# Patient Record
Sex: MALE | Race: WHITE | NOT HISPANIC OR LATINO | Employment: OTHER | URBAN - METROPOLITAN AREA
[De-identification: names, ages, dates, MRNs, and addresses within clinical notes are randomized per-mention and may not be internally consistent; named-entity substitution may affect disease eponyms.]

---

## 2022-08-09 ENCOUNTER — OFFICE VISIT (OUTPATIENT)
Dept: NEUROLOGY | Facility: CLINIC | Age: 77
End: 2022-08-09

## 2022-08-09 VITALS
BODY MASS INDEX: 34.85 KG/M2 | HEART RATE: 59 BPM | SYSTOLIC BLOOD PRESSURE: 117 MMHG | DIASTOLIC BLOOD PRESSURE: 60 MMHG | HEIGHT: 73 IN | WEIGHT: 263 LBS

## 2022-08-09 DIAGNOSIS — F41.8 DEPRESSION WITH ANXIETY: ICD-10-CM

## 2022-08-09 DIAGNOSIS — F45.8 HYPERVENTILATION SYNDROME: Primary | ICD-10-CM

## 2022-08-09 PROCEDURE — 99205 OFFICE O/P NEW HI 60 MIN: CPT | Performed by: PSYCHIATRY & NEUROLOGY

## 2022-08-09 RX ORDER — TIOTROPIUM BROMIDE AND OLODATEROL 3.124; 2.736 UG/1; UG/1
1-2 SPRAY, METERED RESPIRATORY (INHALATION)
COMMUNITY

## 2022-08-09 RX ORDER — CETIRIZINE HYDROCHLORIDE 10 MG/1
10 TABLET ORAL DAILY
COMMUNITY

## 2022-08-09 RX ORDER — AMLODIPINE BESYLATE 2.5 MG/1
2.5 TABLET ORAL DAILY
COMMUNITY

## 2022-08-09 RX ORDER — FAMOTIDINE 20 MG/1
20 TABLET, FILM COATED ORAL 2 TIMES DAILY
COMMUNITY

## 2022-08-09 RX ORDER — FOSINOPRIL SODIUM 40 MG/1
40 TABLET ORAL DAILY
COMMUNITY

## 2022-08-09 RX ORDER — ATORVASTATIN CALCIUM 40 MG/1
40 TABLET, FILM COATED ORAL DAILY
COMMUNITY

## 2022-08-09 RX ORDER — SUCRALFATE 1 G/1
1 TABLET ORAL 4 TIMES DAILY
COMMUNITY

## 2022-08-09 RX ORDER — TERAZOSIN 5 MG/1
5 CAPSULE ORAL NIGHTLY
COMMUNITY

## 2022-08-09 RX ORDER — ASPIRIN 81 MG/1
81 TABLET, CHEWABLE ORAL DAILY
COMMUNITY

## 2022-08-09 RX ORDER — CELECOXIB 200 MG/1
200 CAPSULE ORAL DAILY
COMMUNITY

## 2022-08-09 RX ORDER — MEMANTINE HYDROCHLORIDE 5 MG/1
5 TABLET ORAL 2 TIMES DAILY
COMMUNITY

## 2022-08-09 RX ORDER — GABAPENTIN 100 MG/1
100 CAPSULE ORAL DAILY
COMMUNITY

## 2022-08-09 RX ORDER — DONEPEZIL HYDROCHLORIDE 10 MG/1
10 TABLET, FILM COATED ORAL NIGHTLY
COMMUNITY

## 2022-08-09 NOTE — ASSESSMENT & PLAN NOTE
I discussed with them that I would like for him to be seen by psychiatry to be treated for depression with anxiety.  I discussed with him that the spells that he is having is secondary to underlying psychiatric disorder and that needs to be treated for his spells to get better.  I will see him again in 2 months time for follow-up.    I do not think he has dementia.  Believe his memory problems secondary to depression.  On his  next clinic visit I will reevaluate him and do a mental status examination and take him off memantine Donepezil.

## 2022-08-09 NOTE — ASSESSMENT & PLAN NOTE
Discussed with him and his wife that he the spells that he is having is related to hyperventilation syndrome.  I discussed with them that the hyperventilation is related to underlying psychiatric disorder.  I believe the psychiatric disorder is longstanding depression with anxiety that has not been treated.  I counseled him and his wife regarding slowing down his breathing as well as calming himself down to prevent the spells from progressing.

## 2022-08-09 NOTE — PROGRESS NOTES
"Chief Complaint  Headache (Head CT @ [facility] )    Subjective          Matias Seth is a 76 y.o. male who presents to [facility]  History of Present Illness  76-year-old man evaluated for funny feeling in his head.  He states that this has been ongoing since 2002.  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  He has been working as a  for 30 years and he was in and out of the hospital for 2 to 3 years which she had blood red eyes, short-term memory loss, problems with breathing and he was undergoing Workmen's Compensation and he has not been working since that time and that type of job.  He states that stent of a headache.  Initially he was in here for headache.  He states that he gets this blurred vision, funny feeling in his head, dizziness, unsteadiness that comes in spells.  It can happen 2-3 times a day or once a day or may not happen in a day.  Is been ongoing on a daily basis for years.  If he gets up he is unsteady.  It can last anywhere from 30 seconds to a minute.  His wife is with him today.    She tells me that for the last 10 years he has had mood changes.  He gets upset easily.  He states that he has a lot of stressors in his head and has not seen his children in 17 years.  This is another marriage and they have been together for 16 years.  He has never been treated for depression.  He has been treated at this time for memory loss with memantine as well as Donepezil.    He states that he believes that the spells that he is having could be related to his breathing since if he overexerts himself he will bring on a spell.    Is reported to have unremarkable MRI and CT scan of the brain.    Objective   Vital Signs:   /60 (BP Location: Left arm, Patient Position: Sitting, Cuff Size: Adult)   Pulse 59   Ht 185.4 cm (73\")   Wt 119 kg (263 lb)   BMI 34.70 " kg/m²     Physical Exam   He is alert, fluent, phasic, follows commands well.  Optic disc are normal bilaterally, visual fields full confrontation, EMs full directions gaze, facial strength is full, soft palate elevation and tongue normal.  There is no weakness of the upper or lower extremities and with muscle testing.  Reflexes are decreased in the biceps, triceps, patellar's and ankles.  Cerebellar testing is intact.  Station gait is able to tiptoe, heel walk, brooke without difficulty.  Heart is regular and rhythm normal in rate.  Lungs are clear to auscultation.  I discussed with him regarding hyperventilation.  Hyperventilation for 30 seconds reproduce the same spells that she has been having for years.  His wife was there to witness it.  He states that he got dizzy when I got him up and he felt like he was going to fall.  These are the same spells that he has.        Assessment and Plan  Diagnoses and all orders for this visit:    1. Hyperventilation syndrome (Primary)  Assessment & Plan:  Discussed with him and his wife that he the spells that he is having is related to hyperventilation syndrome.  I discussed with them that the hyperventilation is related to underlying psychiatric disorder.  I believe the psychiatric disorder is longstanding depression with anxiety that has not been treated.  I counseled him and his wife regarding slowing down his breathing as well as calming himself down to prevent the spells from progressing.      2. Depression with anxiety  Assessment & Plan:  I discussed with them that I would like for him to be seen by psychiatry to be treated for depression with anxiety.  I discussed with him that the spells that he is having is secondary to underlying psychiatric disorder and that needs to be treated for his spells to get better.  I will see him again in 2 months time for follow-up.    I do not think he has dementia.  Believe his memory problems secondary to depression.  On his  next  clinic visit I will reevaluate him and do a mental status examination and take him off memantine Donepezil.         Total time spent with the patient and coordinating patient care was 60 minutes.    Follow Up  No follow-ups on file.  Patient was given instructions and counseling regarding his condition or for health maintenance advice. Please see specific information pulled into the AVS if appropriate.